# Patient Record
Sex: FEMALE | Race: BLACK OR AFRICAN AMERICAN | NOT HISPANIC OR LATINO
[De-identification: names, ages, dates, MRNs, and addresses within clinical notes are randomized per-mention and may not be internally consistent; named-entity substitution may affect disease eponyms.]

---

## 2018-05-22 ENCOUNTER — RECORDS - HEALTHEAST (OUTPATIENT)
Dept: ADMINISTRATIVE | Facility: OTHER | Age: 43
End: 2018-05-22

## 2018-05-29 ENCOUNTER — HOSPITAL ENCOUNTER (OUTPATIENT)
Dept: RADIOLOGY | Facility: HOSPITAL | Age: 43
Discharge: HOME | End: 2018-05-29
Admitting: RADIOLOGY

## 2018-05-29 DIAGNOSIS — Z31.9 PROCREATIVE MANAGEMENT: ICD-10-CM

## 2018-06-19 ENCOUNTER — RECORDS - HEALTHEAST (OUTPATIENT)
Dept: ADMINISTRATIVE | Facility: OTHER | Age: 43
End: 2018-06-19

## 2018-11-07 ENCOUNTER — ANESTHESIA - HEALTHEAST (OUTPATIENT)
Dept: SURGERY | Facility: AMBULATORY SURGERY CENTER | Age: 43
End: 2018-11-07

## 2018-11-07 ASSESSMENT — MIFFLIN-ST. JEOR: SCORE: 1287.72

## 2018-11-08 ENCOUNTER — SURGERY - HEALTHEAST (OUTPATIENT)
Dept: SURGERY | Facility: AMBULATORY SURGERY CENTER | Age: 43
End: 2018-11-08

## 2021-06-02 VITALS — WEIGHT: 145 LBS | BODY MASS INDEX: 24.75 KG/M2 | HEIGHT: 64 IN

## 2021-06-16 PROBLEM — Z98.890: Status: ACTIVE | Noted: 2018-11-07

## 2021-06-16 PROBLEM — R10.2 PELVIC PAIN IN FEMALE: Status: ACTIVE | Noted: 2018-11-07

## 2021-06-16 PROBLEM — N92.6 IRREGULAR MENSES: Status: ACTIVE | Noted: 2018-11-07

## 2021-06-16 PROBLEM — E66.3 OVERWEIGHT: Status: ACTIVE | Noted: 2018-11-07

## 2021-06-21 NOTE — ANESTHESIA POSTPROCEDURE EVALUATION
Patient: Cecilio P Tanko  LAPAROSCOPY WITH TUBAL DYE STUDY,  KTP LASER OF ENDOMETRIOSIS,LYSIS OF ADHESIONS,  HYSTEROSCOPY AND DILATION AND CURETTAGE  Anesthesia type: general    Patient location: Phase II Recovery  Last vitals:   Vitals:    11/08/18 0951   BP: 134/83   Pulse: 64   Resp: 16   Temp: 36  C (96.8  F)   SpO2: 94%     Post vital signs: stable  Level of consciousness: awake and responds to simple questions  Post-anesthesia pain: pain controlled  Post-anesthesia nausea and vomiting: no  Pulmonary: unassisted, return to baseline  Cardiovascular: stable and blood pressure at baseline  Hydration: adequate  Anesthetic events: no    QCDR Measures:  ASA# 11 - Laura-op Cardiac Arrest: ASA11B - Patient did NOT experience unanticipated cardiac arrest  ASA# 12 - Laura-op Mortality Rate: ASA12B - Patient did NOT die  ASA# 13 - PACU Re-Intubation Rate: ASA13B - Patient did NOT require a new airway mgmt  ASA# 10 - Composite Anes Safety: ASA10A - No serious adverse event    Additional Notes:

## 2021-06-21 NOTE — ANESTHESIA PREPROCEDURE EVALUATION
Anesthesia Evaluation      Patient summary reviewed     Airway   Mallampati: III  Neck ROM: full   Pulmonary - negative ROS    breath sounds clear to auscultation                         Cardiovascular - negative ROS and normal exam  Exercise tolerance: > or = 4 METS   Neuro/Psych    (+) chronic pain (abdominal pain)    Endo/Other - negative ROS      GI/Hepatic/Renal - negative ROS      Other findings:     NPO 10 PM          Dental    (+) poor dentition                       Anesthesia Plan  Planned anesthetic: general endotracheal      Propofol gtt  Zofran, decadron 10 mg, scopolamine patch        ASA 2   Induction: intravenous   Anesthetic plan and risks discussed with: patient  Anesthesia plan special considerations: antiemetics,   Post-op plan: routine recovery

## 2021-06-21 NOTE — ANESTHESIA CARE TRANSFER NOTE
Last vitals:   Vitals:    11/08/18 0848   BP: 130/90   Pulse: 76   Resp: 16   Temp: 36.4  C (97.6  F)   SpO2: 96%     Pt brought to PACU on 10L facemask. Monitors applied. VSS upon arrival.    Patient's level of consciousness is drowsy  Spontaneous respirations: yes  Maintains airway independently: yes  Dentition unchanged: yes  Oropharynx: oropharynx clear of all foreign objects    QCDR Measures:  ASA# 20 - Surgical Safety Checklist: WHO surgical safety checklist completed prior to induction  PQRS# 430 - Adult PONV Prevention: 4558F - Pt received => 2 anti-emetic agents (different classes) preop & intraop  ASA# 8 - Peds PONV Prevention: NA - Not pediatric patient, not GA or 2 or more risk factors NOT present  PQRS# 424 - Laura-op Temp Management: 4559F - At least one body temp DOCUMENTED => 35.5C or 95.9F within required timeframe  PQRS# 426 - PACU Transfer Protocol: - Transfer of care checklist used  ASA# 14 - Acute Post-op Pain: ASA14B - Patient did NOT experience pain >= 7 out of 10